# Patient Record
Sex: FEMALE | Race: WHITE | URBAN - METROPOLITAN AREA
[De-identification: names, ages, dates, MRNs, and addresses within clinical notes are randomized per-mention and may not be internally consistent; named-entity substitution may affect disease eponyms.]

---

## 2018-08-08 ENCOUNTER — APPOINTMENT (RX ONLY)
Dept: URBAN - METROPOLITAN AREA CLINIC 76 | Facility: CLINIC | Age: 38
Setting detail: DERMATOLOGY
End: 2018-08-08

## 2018-08-08 DIAGNOSIS — L81.4 OTHER MELANIN HYPERPIGMENTATION: ICD-10-CM

## 2018-08-08 DIAGNOSIS — Z41.9 ENCOUNTER FOR PROCEDURE FOR PURPOSES OTHER THAN REMEDYING HEALTH STATE, UNSPECIFIED: ICD-10-CM

## 2018-08-08 PROCEDURE — ? IN-HOUSE DISPENSING PHARMACY

## 2018-08-08 PROCEDURE — ? COUNSELING

## 2018-08-08 PROCEDURE — ? BOTOX

## 2018-08-08 PROCEDURE — ? COSMETIC CONSULTATION - MELASMA

## 2018-08-08 ASSESSMENT — LOCATION DETAILED DESCRIPTION DERM: LOCATION DETAILED: LEFT INFERIOR CENTRAL MALAR CHEEK

## 2018-08-08 ASSESSMENT — LOCATION SIMPLE DESCRIPTION DERM: LOCATION SIMPLE: LEFT CHEEK

## 2018-08-08 ASSESSMENT — LOCATION ZONE DERM: LOCATION ZONE: FACE

## 2018-08-08 NOTE — PROCEDURE: BOTOX
Forehead Units: 0
Lot #: [id number]
Price (Use Numbers Only, No Special Characters Or $): 12
Detail Level: Zone
Expiration Date (Month Year): 01/2021
Consent: Written consent obtained. Risks include but not limited to lid/brow ptosis, bruising, swelling, diplopia, temporary effect, incomplete chemical denervation.
Post-Care Instructions: Patient instructed to not lie down for 4 hours and limit physical activity for 24 hours. Patient instructed not to travel by airplane for 48 hours.
Dilution (U/0.1 Cc): 2.5

## 2018-08-08 NOTE — PROCEDURE: IN-HOUSE DISPENSING PHARMACY
Product 5 Units Dispensed: 0
Product 67 Unit Type: mg
Product 15 Amount/Unit (Numbers Only): 40
Name Of Product 7: Spironolactone - 773934
Product 10 Application Directions: Apply bid to affected areas
Product 4 Price/Unit (In Dollars): 50.00
Name Of Product 15: Pre- Treatment numbing - 201012
Product 33 Application Directions: Apply to affected area two times a day.
Name Of Product 12: Azelaic Acid - 689867
Product 24 Application Directions: Apply to affected area one time a day.
Product 8 Application Directions: Apply to affected area in the evening after moisturizer.  Avoid eyelids.
Product 13 Price/Unit (In Dollars): 40.00
Product 15 Price/Unit (In Dollars): 40
Name Of Product 11: Tacro 0.1% Ointment
Product 51 Application Directions: Apply to affected nails daily for 10 months.
Name Of Product 24: Triamcin 1% Ointment - 297441
Name Of Product 3: Wale / Clind Combo - 823387
Product 12 Application Directions: Apply to affected area qhs
Detail Level: Zone
Name Of Product 13: Rosacea Triple gel - 705028
Name Of Product 42: Kojic Melasma Cream - 816685
Product 42 Application Directions: Apply to hyperpigmented areas in the evening after moisturizer.
Name Of Product 33: Tacro 0.1% Ointment - 212959
Product 5 Application Directions: Apply to affected area in the evening after moisturizer.  Avoid eyelids.
Name Of Product 2: Anti-Aging Tretinoin 0.025% - 92374
Name Of Product 51: Anti- Fungal Nail Solution - 699327
Name Of Product 9: Imiqui 5% / Levo 1% Gel -211072
Product 15 Application Directions: apply to affected areas 30 minprior treatment
Name Of Product 8: Taza 0.1% Cream - 272815
Product 14 Units Dispensed: 1
Product 9 Menendez/Unit (In Dollars): 50
Product 11 Price/Unit (In Dollars): 50
Name Of Product 4: Acne Gel w/ Dapsone - 721130
Name Of Product 41: Hydroquin 6% Combo Cream - 661749
Product 3 Application Directions: Apply to acne prone area after moisturizer. Avoid eyelids.
Render Refills If Set To 0: Yes
Name Of Product 10: Clob 0.05% Ointment 60gm - 641453
Product 9 Application Directions: Apply to affected area every other night
Product 13 Application Directions: Apply to affected area daily
Name Of Product 6: Adap Combo Cream - 756817
Product 10 Price/Unit (In Dollars): 45
Product 21 Application Directions: Apply to affected area in the evening or every other evening.
Name Of Product 5: Clind / Tret Combo Cream - 374875
Product 4 Application Directions: Apply to affected area before moisturizer one time a day.
Name Of Product 14: Hydroquin Combo cream - 240171
Product 14 Application Directions: Apply to affected area qd
Name Of Product 21: Imiqui 5% / Levo 1% Gel - 976049
Name Of Product 1: Tret 0.05% Cream - 776107
Name Of Product 31: Ivermec 1% / Met 1% Gel - 025307
Product 1 Application Directions: Apply qhs to affected areas

## 2018-08-23 ENCOUNTER — APPOINTMENT (RX ONLY)
Dept: URBAN - METROPOLITAN AREA CLINIC 76 | Facility: CLINIC | Age: 38
Setting detail: DERMATOLOGY
End: 2018-08-23

## 2018-08-23 DIAGNOSIS — Z41.9 ENCOUNTER FOR PROCEDURE FOR PURPOSES OTHER THAN REMEDYING HEALTH STATE, UNSPECIFIED: ICD-10-CM

## 2018-08-23 PROCEDURE — ? BOTOX

## 2018-08-28 ENCOUNTER — APPOINTMENT (RX ONLY)
Dept: URBAN - METROPOLITAN AREA CLINIC 76 | Facility: CLINIC | Age: 38
Setting detail: DERMATOLOGY
End: 2018-08-28

## 2018-08-28 DIAGNOSIS — Z41.9 ENCOUNTER FOR PROCEDURE FOR PURPOSES OTHER THAN REMEDYING HEALTH STATE, UNSPECIFIED: ICD-10-CM

## 2018-08-28 PROCEDURE — ? PATIENT SPECIFIC COUNSELING

## 2018-08-28 ASSESSMENT — LOCATION SIMPLE DESCRIPTION DERM: LOCATION SIMPLE: LEFT CHEEK

## 2018-08-28 ASSESSMENT — LOCATION ZONE DERM: LOCATION ZONE: FACE

## 2018-08-28 ASSESSMENT — LOCATION DETAILED DESCRIPTION DERM: LOCATION DETAILED: LEFT INFERIOR CENTRAL MALAR CHEEK

## 2018-08-28 NOTE — PROCEDURE: PATIENT SPECIFIC COUNSELING
Detail Level: Zone
Patient has rash present on face due to over use of hydroquinone. Consulted with Myra and confirmed discontinuation of hydroquinone best. Patient received samples of Cloderm to apply to red itchy areas. Patient will call if rash does not subside in 2 days.

## 2018-08-31 ENCOUNTER — APPOINTMENT (RX ONLY)
Dept: URBAN - METROPOLITAN AREA CLINIC 76 | Facility: CLINIC | Age: 38
Setting detail: DERMATOLOGY
End: 2018-08-31

## 2018-08-31 DIAGNOSIS — Z41.9 ENCOUNTER FOR PROCEDURE FOR PURPOSES OTHER THAN REMEDYING HEALTH STATE, UNSPECIFIED: ICD-10-CM

## 2018-08-31 PROCEDURE — ? CHEMICAL PEEL

## 2018-08-31 ASSESSMENT — LOCATION ZONE DERM: LOCATION ZONE: FACE

## 2018-08-31 ASSESSMENT — LOCATION SIMPLE DESCRIPTION DERM: LOCATION SIMPLE: LEFT CHEEK

## 2018-08-31 ASSESSMENT — LOCATION DETAILED DESCRIPTION DERM: LOCATION DETAILED: LEFT CENTRAL MALAR CHEEK

## 2018-08-31 NOTE — PROCEDURE: CHEMICAL PEEL
Consent: Prior to the procedure, written consent was obtained and risks were reviewed, including but not limited to: redness, peeling, blistering, pigmentary change, scarring, infection, and pain.
Post Peel Care: After the procedure, a post-peel cream was applied to the treated areas. Sun protection and post-care instructions were reviewed with the patient.
Prep: The treated area was degreased with pre-peel cleanser, and vaseline was applied for protection of mucous membranes.
Chemical Peel: Vi
Number Of Layers: 2
Price (Use Numbers Only, No Special Characters Or $): 100
Treatment Number: 1
Post-Care Instructions: I reviewed with the patient in detail post-care instructions. Patient should avoid sun exposure and wear sun protection.
Treatment Time (Optional): Patient will leave peel on for 6 hours
Detail Level: Zone

## 2018-09-06 ENCOUNTER — APPOINTMENT (RX ONLY)
Dept: URBAN - METROPOLITAN AREA CLINIC 76 | Facility: CLINIC | Age: 38
Setting detail: DERMATOLOGY
End: 2018-09-06

## 2018-09-06 DIAGNOSIS — Z41.9 ENCOUNTER FOR PROCEDURE FOR PURPOSES OTHER THAN REMEDYING HEALTH STATE, UNSPECIFIED: ICD-10-CM

## 2018-09-06 PROCEDURE — ? BOTOX

## 2018-09-06 ASSESSMENT — LOCATION ZONE DERM
LOCATION ZONE: FACE
LOCATION ZONE: FACE

## 2018-09-06 ASSESSMENT — LOCATION DETAILED DESCRIPTION DERM
LOCATION DETAILED: LEFT LATERAL EYEBROW
LOCATION DETAILED: LEFT INFERIOR MEDIAL FOREHEAD
LOCATION DETAILED: LEFT INFERIOR FOREHEAD
LOCATION DETAILED: LEFT LATERAL EYEBROW
LOCATION DETAILED: RIGHT LATERAL EYEBROW

## 2018-09-06 ASSESSMENT — LOCATION SIMPLE DESCRIPTION DERM
LOCATION SIMPLE: RIGHT EYEBROW
LOCATION SIMPLE: LEFT EYEBROW
LOCATION SIMPLE: LEFT EYEBROW
LOCATION SIMPLE: LEFT FOREHEAD

## 2018-09-06 NOTE — PROCEDURE: BOTOX
Periorbital Skin Units: 3
Lot #: [id number]
Additional Area 6 Units: 0
Dilution (U/0.1 Cc): 2.5
Consent: Written consent obtained. Risks include but not limited to lid/brow ptosis, bruising, swelling, diplopia, temporary effect, incomplete chemical denervation.
Post-Care Instructions: Patient instructed to not lie down for 4 hours and limit physical activity for 24 hours. Patient instructed not to travel by airplane for 48 hours.
Expiration Date (Month Year): 09/2020
Detail Level: Zone

## 2018-12-18 ENCOUNTER — APPOINTMENT (RX ONLY)
Dept: URBAN - METROPOLITAN AREA CLINIC 48 | Facility: CLINIC | Age: 38
Setting detail: DERMATOLOGY
End: 2018-12-18

## 2018-12-18 DIAGNOSIS — Z41.9 ENCOUNTER FOR PROCEDURE FOR PURPOSES OTHER THAN REMEDYING HEALTH STATE, UNSPECIFIED: ICD-10-CM

## 2018-12-18 PROCEDURE — ? BOTOX

## 2018-12-18 ASSESSMENT — LOCATION ZONE DERM: LOCATION ZONE: FACE

## 2018-12-18 ASSESSMENT — LOCATION SIMPLE DESCRIPTION DERM: LOCATION SIMPLE: GLABELLA

## 2018-12-18 ASSESSMENT — LOCATION DETAILED DESCRIPTION DERM: LOCATION DETAILED: GLABELLA

## 2019-04-29 ENCOUNTER — APPOINTMENT (RX ONLY)
Dept: URBAN - METROPOLITAN AREA CLINIC 48 | Facility: CLINIC | Age: 39
Setting detail: DERMATOLOGY
End: 2019-04-29

## 2019-04-29 DIAGNOSIS — Z41.9 ENCOUNTER FOR PROCEDURE FOR PURPOSES OTHER THAN REMEDYING HEALTH STATE, UNSPECIFIED: ICD-10-CM

## 2019-04-29 PROCEDURE — ? BOTOX

## 2019-04-29 NOTE — PROCEDURE: BOTOX
Additional Area 5 Units: 0
Detail Level: Zone
Post-Care Instructions: Patient instructed to not lie down for 4 hours and limit physical activity for 24 hours.
Dilution (U/0.1 Cc): 2.5
Expiration Date (Month Year): 09/2021
Consent: Written consent obtained. Risks include but not limited to lid/brow ptosis, bruising, swelling, diplopia, temporary effect, incomplete chemical denervation.
Lot #: [id number]

## 2019-05-21 ENCOUNTER — APPOINTMENT (RX ONLY)
Dept: URBAN - METROPOLITAN AREA CLINIC 48 | Facility: CLINIC | Age: 39
Setting detail: DERMATOLOGY
End: 2019-05-21

## 2019-07-02 NOTE — PROCEDURE: BOTOX
Lateral Platysmal Bands Units: 0
Post-Care Instructions: Patient instructed to not lie down for 4 hours and limit physical activity for 24 hours. Patient instructed not to travel by airplane for 48 hours.
Dilution (U/0.1 Cc): 2.5
Consent: Written consent obtained. Risks include but not limited to lid/brow ptosis, bruising, swelling, diplopia, temporary effect, incomplete chemical denervation.
Detail Level: Zone
[place]aina